# Patient Record
Sex: FEMALE | HISPANIC OR LATINO | Employment: UNEMPLOYED | URBAN - METROPOLITAN AREA
[De-identification: names, ages, dates, MRNs, and addresses within clinical notes are randomized per-mention and may not be internally consistent; named-entity substitution may affect disease eponyms.]

---

## 2017-02-13 ENCOUNTER — OFFICE VISIT (OUTPATIENT)
Dept: NEUROLOGY | Facility: MEDICAL CENTER | Age: 32
End: 2017-02-13
Payer: COMMERCIAL

## 2017-02-13 VITALS
OXYGEN SATURATION: 96 % | HEART RATE: 62 BPM | BODY MASS INDEX: 23 KG/M2 | SYSTOLIC BLOOD PRESSURE: 106 MMHG | HEIGHT: 62 IN | TEMPERATURE: 98.2 F | WEIGHT: 125 LBS | DIASTOLIC BLOOD PRESSURE: 68 MMHG | RESPIRATION RATE: 16 BRPM

## 2017-02-13 DIAGNOSIS — G43.019 INTRACTABLE MIGRAINE WITHOUT AURA AND WITHOUT STATUS MIGRAINOSUS: Primary | ICD-10-CM

## 2017-02-13 PROCEDURE — 99213 OFFICE O/P EST LOW 20 MIN: CPT | Performed by: PSYCHIATRY & NEUROLOGY

## 2017-02-13 RX ORDER — TOPIRAMATE SPINKLE 15 MG/1
15 CAPSULE ORAL EVERY EVENING
Qty: 30 CAP | Refills: 0 | Status: SHIPPED | OUTPATIENT
Start: 2017-02-13 | End: 2017-08-14 | Stop reason: SDUPTHER

## 2017-02-13 RX ORDER — TOPIRAMATE 25 MG/1
25 TABLET ORAL EVERY EVENING
Qty: 90 TAB | Refills: 3 | Status: SHIPPED | OUTPATIENT
Start: 2017-02-13 | End: 2017-08-14 | Stop reason: SDUPTHER

## 2017-02-13 RX ORDER — SUMATRIPTAN 100 MG/1
TABLET, FILM COATED ORAL
Qty: 27 TAB | Refills: 3 | Status: SHIPPED | OUTPATIENT
Start: 2017-02-13 | End: 2017-08-14 | Stop reason: SDUPTHER

## 2017-02-13 ASSESSMENT — ENCOUNTER SYMPTOMS: HEADACHES: 1

## 2017-02-13 NOTE — PROGRESS NOTES
"Subjective:      Jaylyn Ogden is a 31 y.o. female who presents with her , who provides translation services, for follow-up with a history of migraine without aura.     HPI    Jaylyn's headaches are actually improved, on Topamax 25 mg daily at bedtime, she is having them maybe once a week. Her  recognizes that she is much happier, she is functioning at a higher level on a more regular basis. She still has headaches maybe once a week, for the sumatriptan 100 mg is used early, she actually has good inconsistent relief within an hour or so. This helps all of the symptoms that are part of each headache attack. Higher doses of Topamax weren't tolerated because of chills. She tolerates the present dose without issue, sumatriptan is well tolerated as well.    Medical, surgical and family histories are reviewed in the electronic health record, there are no new drug allergies. Other than birth control, she is on Topamax 25 mg daily at bedtime and Imitrex 100 mg tablets as needed.    Review of Systems   Neurological: Positive for headaches.   All other systems reviewed and are negative.       Objective:     /68 mmHg  Pulse 62  Temp(Src) 36.8 °C (98.2 °F)  Resp 16  Ht 1.575 m (5' 2.01\")  Wt 56.7 kg (125 lb)  BMI 22.86 kg/m2  SpO2 96%     Physical Exam    She appears no acute distress. Vital signs are stable. There is no malar rash or jaw claudication. The neck is supple. Including mental status, cranial nerves, motor, coordination, and sensory evaluations, the full examination was performed in quick and cursory fashion, again revealing no evidence of deficits.     Assessment/Plan:     1. Intractable migraine without aura and without status migrainosus  Improved, we will try a slightly smaller incremental increase in Topamax, adding 15 mg, totaling 40 mg daily at bedtime. If side effects recur, but if they're milder, I asked her to stick it out for a little longer since a lot of the time " things attenuate. If so, the higher dose will probably provide better benefit. The only other option would be to change her to a different medication, and with this, there is no clear likelihood that it will be better or better tolerated. In that circumstance, she would likely it stick out on Topamax 25 mg daily at bedtime. They will update me as to tolerability and efficacy of the higher dose of Topamax. She knows how to use Imitrex appropriately. Face-to-face time was spent reviewing all the above. I will follow-up with her in 6 months.    - sumatriptan (IMITREX) 100 MG tablet; 1 tab at headache onset; repeat in 1 hour prn  Dispense: 27 Tab; Refill: 3  - topiramate (TOPOMAX) 15 MG CAPSULE SPRINKLE; Take 1 Cap by mouth every evening.  Dispense: 30 Cap; Refill: 0  - topiramate (TOPAMAX) 25 MG Tab; Take 1 Tab by mouth every evening.  Dispense: 90 Tab; Refill: 3    Time: Evaluation 20 minutes for exam, review, discussion, and education  Discussion: As mentioned in assessment, over 50% of the time spent face-to-face counseling and coordinating care

## 2017-02-13 NOTE — MR AVS SNAPSHOT
"Waldemareseleazar Ogden   2017 10:40 AM   Office Visit   MRN: [id number]    Department:  Neurology [facility]   Dept Phone:  [phone]    Description:  Female : 1985   Provider:  Remy Wong M.D.           Reason for Visit     Follow-Up migraine      Allergies as of 2017     Allergen Noted Reactions    No Known Drug Allergy 2012         You were diagnosed with     Intractable migraine without aura and without status migrainosus   [108513]  -  Primary       Vital Signs     Blood Pressure Pulse Temperature Respirations Height Weight    106/68 mmHg 62 36.8 °C (98.2 °F) 16 1.575 m (5' 2.01\") 56.7 kg (125 lb)    Body Mass Index Oxygen Saturation Smoking Status             22.86 kg/m2 96% Never Smoker          Basic Information     Date Of Birth Sex Race Ethnicity Preferred Language    1985 Female  or   Origin (Sammarinese,Kazakh,Jamaican,Citizen of [place], etc) English      Your appointments     Aug 14, 2017 10:00 AM   Follow Up Visit with Remy Wong M.D.   [facility] (--)    [address], [address]  [facility] [id number] [phone]           You will be receiving a confirmation call a few days before your appointment from our automated call confirmation system.              Problem List              ICD-10-CM Priority Class Noted - Resolved    Intractable migraine without aura and without status migrainosus G43.019   2016 - Present      Health Maintenance        Date Due Completion Dates    IMM DTaP/Tdap/Td Vaccine (1 - Tdap) 3/9/2006 ---    PAP SMEAR 3/9/2008 ---    IMM INFLUENZA (1) 2016 ---            Current Immunizations     No immunizations on file.      Below and/or attached are the medications your provider expects you to take. Review all of your home medications and newly ordered medications with your provider and/or pharmacist. Follow medication instructions as directed by your provider and/or pharmacist. Please keep " your medication list with you and share with your provider. Update the information when medications are discontinued, doses are changed, or new medications (including over-the-counter products) are added; and carry medication information at all times in the event of emergency situations     Allergies:  NO KNOWN DRUG ALLERGY - (reactions not documented)               Medications  Valid as of: February 13, 2017 - 11:02 AM    Generic Name Brand Name Tablet Size Instructions for use    Levonorgestrel-Ethinyl Estrad   Take  by mouth.        SUMAtriptan Succinate (Tab) IMITREX 100 MG 1 tab at headache onset; repeat in 1 hour prn        Topiramate (CAPSULE SPRINKLE) TOPOMAX 15 MG Take 1 Cap by mouth every evening.        Topiramate (Tab) TOPAMAX 25 MG Take 1 Tab by mouth every evening.        .                 Medicines prescribed today were sent to:     [facility][id number] - [place] - [address]    [address] [place] [address]    Phone: [phone] Fax: [phone]    Open 24 Hours?: No      Medication refill instructions:       If your prescription bottle indicates you have medication refills left, it is not necessary to call your provider’s office. Please contact your pharmacy and they will refill your medication.    If your prescription bottle indicates you do not have any refills left, you may request refills at any time through one of the following ways: The online [employer] system (except Urgent Care), by calling your provider’s office, or by asking your pharmacy to contact your provider’s office with a refill request. Medication refills are processed only during regular business hours and may not be available until the next business day. Your provider may request additional information or to have a follow-up visit with you prior to refilling your medication.   *Please Note: Medication refills are assigned a new Rx number when refilled electronically. Your pharmacy may indicate that no refills were authorized even  though a new prescription for the same medication is available at the pharmacy. Please request the medicine by name with the pharmacy before contacting your provider for a refill.           Coupad Access Code: [email or web address]  Expires: 3/15/2017 11:02 AM    Your email address is not on file at [facility].  Email Addresses are required for you to sign up for Coupad, please contact [phone] to verify your personal information and to provide your email address prior to attempting to register for Coupad.    Jaylyn Mccarthy Melvi  [address] [place] [address]    Coupad  A secure, online tool to manage your health information     [employer] [facility]’s Coupad® is a secure, online tool that connects you to your personalized health information from the privacy of your home -- day or night - making it very easy for you to manage your healthcare. Once the activation process is completed, you can even access your medical information using the Coupad ventura, which is available for free in the Apple Ventura store or Google Play store.     To learn more about Coupad, visit [email or web address]    There are two levels of access available (as shown below):   My Chart Features  [facility] Primary Care Doctor [facility]  Specialists [facility]  Urgent  Care Non-[facility] Primary Care Doctor   Email your healthcare team securely and privately 24/7 X X X    Manage appointments: schedule your next appointment; view details of past/upcoming appointments X      Request prescription refills. X      View recent personal medical records, including lab and immunizations X X X X   View health record, including health history, allergies, medications X X X X   Read reports about your outpatient visits, procedures, consult and ER notes X X X X   See your discharge summary, which is a recap of your hospital and/or ER visit that includes your diagnosis, lab results, and care plan X X  X     How to register for Coupad:  Once your e-mail address has been  verified, follow the following steps to sign up for [employer].     1. Go to  [email or web address]  2. Click on the Sign Up Now box, which takes you to the New Member Sign Up page. You will need to provide the following information:  a. Enter your Oceanlinx Access Code exactly as it appears at the top of this page. (You will not need to use this code after you’ve completed the sign-up process. If you do not sign up before the expiration date, you must request a new code.)   b. Enter your date of birth.   c. Enter your home email address.   d. Click Submit, and follow the next screen’s instructions.  3. Create a Oceanlinx ID. This will be your Oceanlinx login ID and cannot be changed, so think of one that is secure and easy to remember.  4. Create a Netscapet password. You can change your password at any time.  5. Enter your Password Reset Question and Answer. This can be used at a later time if you forget your password.   6. Enter your e-mail address. This allows you to receive e-mail notifications when new information is available in Oceanlinx.  7. Click Sign Up. You can now view your health information.    For assistance activating your Oceanlinx account, call [phone]

## 2017-03-20 ENCOUNTER — TELEPHONE (OUTPATIENT)
Dept: NEUROLOGY | Facility: MEDICAL CENTER | Age: 32
End: 2017-03-20

## 2017-03-20 NOTE — TELEPHONE ENCOUNTER
Pt's  is calling and stating that the pt is doing really well with the Topamax 25 mg at bed time. He will call and get refills at the pharmacy for her medication. GALEN

## 2017-08-14 ENCOUNTER — OFFICE VISIT (OUTPATIENT)
Dept: NEUROLOGY | Facility: MEDICAL CENTER | Age: 32
End: 2017-08-14
Payer: COMMERCIAL

## 2017-08-14 VITALS
TEMPERATURE: 97.3 F | SYSTOLIC BLOOD PRESSURE: 116 MMHG | RESPIRATION RATE: 14 BRPM | BODY MASS INDEX: 22.87 KG/M2 | OXYGEN SATURATION: 99 % | WEIGHT: 124.3 LBS | HEART RATE: 60 BPM | DIASTOLIC BLOOD PRESSURE: 70 MMHG | HEIGHT: 62 IN

## 2017-08-14 DIAGNOSIS — G43.019 INTRACTABLE MIGRAINE WITHOUT AURA AND WITHOUT STATUS MIGRAINOSUS: Primary | ICD-10-CM

## 2017-08-14 PROCEDURE — 99213 OFFICE O/P EST LOW 20 MIN: CPT | Performed by: PSYCHIATRY & NEUROLOGY

## 2017-08-14 RX ORDER — SUMATRIPTAN 100 MG/1
TABLET, FILM COATED ORAL
Qty: 27 TAB | Refills: 3 | Status: SHIPPED | OUTPATIENT
Start: 2017-08-14 | End: 2018-08-13

## 2017-08-14 RX ORDER — TOPIRAMATE SPINKLE 15 MG/1
15 CAPSULE ORAL EVERY EVENING
Qty: 90 CAP | Refills: 3 | Status: SHIPPED | OUTPATIENT
Start: 2017-08-14 | End: 2018-08-13 | Stop reason: SDUPTHER

## 2017-08-14 RX ORDER — TOPIRAMATE 25 MG/1
25 TABLET ORAL EVERY EVENING
Qty: 90 TAB | Refills: 3 | Status: SHIPPED | OUTPATIENT
Start: 2017-08-14 | End: 2018-08-13 | Stop reason: SDUPTHER

## 2017-08-14 ASSESSMENT — ENCOUNTER SYMPTOMS
HEADACHES: 1
MEMORY LOSS: 0
SPEECH CHANGE: 0

## 2017-08-14 ASSESSMENT — PATIENT HEALTH QUESTIONNAIRE - PHQ9: CLINICAL INTERPRETATION OF PHQ2 SCORE: 0

## 2017-08-14 ASSESSMENT — PAIN SCALES - GENERAL: PAINLEVEL: 3=SLIGHT PAIN

## 2017-08-14 NOTE — PROGRESS NOTES
"Subjective:      Jaylyn Ogden is a 32 y.o. female who presents with her  Joshua, who helps with translation, for follow-up with a history of migraine without aura.    HPI    Jaylyn had done well for the first month after I had last seen her, we did increase Topamax from 25 mg daily at bedtime to 40 mg daily at bedtime. She tolerated the dosing, unfortunately, a miscommunication with the office prevented her from refilling the 15 mg strength tablet, she has been back on 25 mg daily at bedtime since then for the last 4 months. The headache control is still good, though still suboptimal. On the higher dose, she essentially was headache free. Imitrex 100 mg still provides good benefit when used. In general she still remains happy.    Medical, surgical and family history reviewed, there are no drug allergies. She is on Topamax 25 mg daily at bedtime, Imitrex 100 mg when necessary and her birth control.    Review of Systems   Constitutional: Negative for malaise/fatigue.   Neurological: Positive for headaches. Negative for speech change.   Psychiatric/Behavioral: Negative for memory loss.   All other systems reviewed and are negative.       Objective:     /70 mmHg  Pulse 60  Temp(Src) 36.3 °C (97.3 °F)  Resp 14  Ht 1.575 m (5' 2\")  Wt 56.382 kg (124 lb 4.8 oz)  BMI 22.73 kg/m2  SpO2 99%     Physical Exam    She appears in no acute distress. Vital signs are stable. There is no malar rash. The neck is supple. Cardiac evaluation reveals a regular rhythm. There is no lower extremity edema. Including mental status, cranial nerves, motor, reflex, coordination, and sensory evaluations, the full neurologic examination is done and reveals no evidence of deficits nor toxicities.     Assessment/Plan:     1. Intractable migraine without aura and without status migrainosus  We will re-increase Topamax to 40 mg daily at bedtime. Higher doses, fortunately, will not be required at this time, also a good thing " since she doesn't tolerate higher doses very well. She has no side effects at this dose. The headache control seems to be much better than 25 mg alone, I've refilled her for the next year to make sure the same SNAFU does not recur. I will follow up one year otherwise. Phone contact in the interim if needed.    - topiramate (TOPOMAX) 15 MG CAPSULE SPRINKLE; Take 1 Cap by mouth every evening.  Dispense: 90 Cap; Refill: 3  - topiramate (TOPAMAX) 25 MG Tab; Take 1 Tab by mouth every evening.  Dispense: 90 Tab; Refill: 3  - sumatriptan (IMITREX) 100 MG tablet; 1 tab at headache onset; repeat in 1 hour prn  Dispense: 27 Tab; Refill: 3    Time: Evaluation of 20 minutes for exam, review, discussion, and education  Discussion: As mentioned in the assessment, over 50% of the time spent face-to-face counseling and coordinate care

## 2017-08-14 NOTE — MR AVS SNAPSHOT
"Jaylyn Ogden   2017 10:00 AM   Office Visit   MRN: [id number]    Department:  [facility]   Dept Phone:  [phone]    Description:  Female : 1985   Provider:  Remy Wong M.D.           Reason for Visit     Follow-Up 6m FV, intractable migraine w/o aura and w/o status migrainosus      Allergies as of 2017     Allergen Noted Reactions    No Known Drug Allergy 2012         You were diagnosed with     Intractable migraine without aura and without status migrainosus   [104225]  -  Primary       Vital Signs     Blood Pressure Pulse Temperature Respirations Height Weight    116/70 mmHg 60 36.3 °C (97.3 °F) 14 1.575 m (5' 2\") 56.382 kg (124 lb 4.8 oz)    Body Mass Index Oxygen Saturation Smoking Status             22.73 kg/m2 99% Never Smoker          Basic Information     Date Of Birth Sex Race Ethnicity Preferred Language    1985 Female  or   Origin (Monegasque,Cuban,Mauritian,Christo, etc) English      Problem List              ICD-10-CM Priority Class Noted - Resolved    Intractable migraine without aura and without status migrainosus G43.019   2016 - Present      Health Maintenance        Date Due Completion Dates    IMM DTaP/Tdap/Td Vaccine (1 - Tdap) 3/9/2006 ---    PAP SMEAR 3/9/2008 ---    IMM INFLUENZA (1) 2017 ---            Current Immunizations     No immunizations on file.      Below and/or attached are the medications your provider expects you to take. Review all of your home medications and newly ordered medications with your provider and/or pharmacist. Follow medication instructions as directed by your provider and/or pharmacist. Please keep your medication list with you and share with your provider. Update the information when medications are discontinued, doses are changed, or new medications (including over-the-counter products) are added; and carry medication information at all times in the event of emergency " situations     Allergies:  NO KNOWN DRUG ALLERGY - (reactions not documented)               Medications  Valid as of: August 14, 2017 - 10:20 AM    Generic Name Brand Name Tablet Size Instructions for use    Levonorgestrel-Ethinyl Estrad   Take  by mouth.        SUMAtriptan Succinate (Tab) IMITREX 100 MG 1 tab at headache onset; repeat in 1 hour prn        Topiramate (CAPSULE SPRINKLE) TOPOMAX 15 MG Take 1 Cap by mouth every evening.        Topiramate (Tab) TOPAMAX 25 MG Take 1 Tab by mouth every evening.        .                 Medicines prescribed today were sent to:     [facility][id number] - [place] - [address] [place] [address]    Phone: [phone] Fax: [phone]    Open 24 Hours?: No      Medication refill instructions:       If your prescription bottle indicates you have medication refills left, it is not necessary to call your provider’s office. Please contact your pharmacy and they will refill your medication.    If your prescription bottle indicates you do not have any refills left, you may request refills at any time through one of the following ways: The online ZupCat system (except Urgent Care), by calling your provider’s office, or by asking your pharmacy to contact your provider’s office with a refill request. Medication refills are processed only during regular business hours and may not be available until the next business day. Your provider may request additional information or to have a follow-up visit with you prior to refilling your medication.   *Please Note: Medication refills are assigned a new Rx number when refilled electronically. Your pharmacy may indicate that no refills were authorized even though a new prescription for the same medication is available at the pharmacy. Please request the medicine by name with the pharmacy before contacting your provider for a refill.           ZupCat Access Code: [email or web address]  Expires: 9/13/2017  9:42 AM    Your email  address is not on file at [facility].  Email Addresses are required for you to sign up for [employer], please contact [phone] to verify your personal information and to provide your email address prior to attempting to register for [employer].    Jaylyn Mccarthy Depaz  [address] [place] [address]    MyChart  A secure, online tool to manage your health information     [facility]’s [employer]® is a secure, online tool that connects you to your personalized health information from the privacy of your home -- day or night - making it very easy for you to manage your healthcare. Once the activation process is completed, you can even access your medical information using the [employer] ventura, which is available for free in the Apple Ventura store or Google Play store.     To learn more about [employer], visit [email or web address]    There are two levels of access available (as shown below):   My Chart Features  [facility] Primary Care Doctor [facility]  Specialists [facility]  Urgent  Care Non-[facility] Primary Care Doctor   Email your healthcare team securely and privately 24/7 X X X    Manage appointments: schedule your next appointment; view details of past/upcoming appointments X      Request prescription refills. X      View recent personal medical records, including lab and immunizations X X X X   View health record, including health history, allergies, medications X X X X   Read reports about your outpatient visits, procedures, consult and ER notes X X X X   See your discharge summary, which is a recap of your hospital and/or ER visit that includes your diagnosis, lab results, and care plan X X  X     How to register for TapSenset:  Once your e-mail address has been verified, follow the following steps to sign up for TapSenset.     1. Go to  [email or web address]  2. Click on the Sign Up Now box, which takes you to the New Member Sign Up page. You will need to provide the following information:  a. Enter your [employer] Access Code  exactly as it appears at the top of this page. (You will not need to use this code after you’ve completed the sign-up process. If you do not sign up before the expiration date, you must request a new code.)   b. Enter your date of birth.   c. Enter your home email address.   d. Click Submit, and follow the next screen’s instructions.  3. Create a [employer] ID. This will be your [employer] login ID and cannot be changed, so think of one that is secure and easy to remember.  4. Create a [employer] password. You can change your password at any time.  5. Enter your Password Reset Question and Answer. This can be used at a later time if you forget your password.   6. Enter your e-mail address. This allows you to receive e-mail notifications when new information is available in [employer].  7. Click Sign Up. You can now view your health information.    For assistance activating your [employer] account, call [phone]

## 2018-01-07 ENCOUNTER — OFFICE VISIT (OUTPATIENT)
Dept: URGENT CARE | Facility: PHYSICIAN GROUP | Age: 33
End: 2018-01-07
Payer: COMMERCIAL

## 2018-01-07 VITALS
SYSTOLIC BLOOD PRESSURE: 126 MMHG | HEIGHT: 62 IN | HEART RATE: 65 BPM | DIASTOLIC BLOOD PRESSURE: 80 MMHG | TEMPERATURE: 99.1 F | OXYGEN SATURATION: 98 % | WEIGHT: 125 LBS | BODY MASS INDEX: 23 KG/M2

## 2018-01-07 DIAGNOSIS — H10.31 ACUTE BACTERIAL CONJUNCTIVITIS OF RIGHT EYE: ICD-10-CM

## 2018-01-07 DIAGNOSIS — J11.1 INFLUENZA: ICD-10-CM

## 2018-01-07 PROCEDURE — 99214 OFFICE O/P EST MOD 30 MIN: CPT | Performed by: PHYSICIAN ASSISTANT

## 2018-01-07 RX ORDER — ALBUTEROL SULFATE 90 UG/1
2 AEROSOL, METERED RESPIRATORY (INHALATION) EVERY 4 HOURS PRN
Qty: 1 INHALER | Refills: 0 | Status: SHIPPED | OUTPATIENT
Start: 2018-01-07 | End: 2018-05-18

## 2018-01-07 RX ORDER — POLYMYXIN B SULFATE AND TRIMETHOPRIM 1; 10000 MG/ML; [USP'U]/ML
1 SOLUTION OPHTHALMIC EVERY 4 HOURS
Qty: 1 BOTTLE | Refills: 0 | Status: SHIPPED | OUTPATIENT
Start: 2018-01-07

## 2018-01-07 ASSESSMENT — ENCOUNTER SYMPTOMS
PSYCHIATRIC NEGATIVE: 1
HEADACHES: 1
EYES NEGATIVE: 1
COUGH: 1
GASTROINTESTINAL NEGATIVE: 1
CARDIOVASCULAR NEGATIVE: 1
MYALGIAS: 1
FEVER: 1
CHILLS: 1

## 2018-01-07 NOTE — PATIENT INSTRUCTIONS
Flonase and nasal saline irrigation (netti pot or Jimmy Med Sinus Rinse).  Used distilled water or boiled tap water with nasal flushes, not straight tap water.  Humidifier at bedtime.  Hot steam showers to loosen up mucous.  Cough medicine such Delsym .  Lots of fluids, tea with honey.  Inhaler every 4 hours.  Salt water gargles.  Switch toothbrush in 2 days.  Return if worsening: Yellow thicker mucus changes, worsening pain around the eyes and radiates to the teeth, and fever over 101°F.

## 2018-01-07 NOTE — PROGRESS NOTES
Subjective:      Jaylyn Ogden is a 32 y.o. female who presents with Eye Problem (x 1 day. Right eye red with discharge.) and Cough (x 1 week, runny nosel.)            HPI     Chief Complaint   Patient presents with   • Eye Problem     x 1 day. Right eye red with discharge.   • Cough     x 1 week, runny nosel.       HPI:  Jaylyn Ogden is a 32 y.o. Female who presents with eye mucosu and a cough and runny nose for 1 week.  Having irriatation and gritty feeling.  Having matted lashes.  Runny nose for 1 week with green mucous.   Non productive cough.  Some white drainage.  Has tried alkerseltzer plus and cough and cold medicine.  Did help a little bit.  Some fever and body aches and chills during the days.      Patient denies SOB, chest pain, palpitations, or n/v/d.      Past Medical History:   Diagnosis Date   • Migraine        No past surgical history on file.    No family history on file.  No pertinent family history.    Social History     Social History   • Marital status:      Spouse name: N/A   • Number of children: N/A   • Years of education: N/A     Occupational History   • Not on file.     Social History Main Topics   • Smoking status: Never Smoker   • Smokeless tobacco: Never Used   • Alcohol use No   • Drug use: No   • Sexual activity: Yes     Partners: Male     Birth control/ protection: Pill     Other Topics Concern   • Not on file     Social History Narrative   • No narrative on file         Current Outpatient Prescriptions:   •  topiramate, 15 mg, Oral, Q EVENING, Taking  •  topiramate, 25 mg, Oral, Q EVENING, Taking  •  Levonorgestrel-Ethinyl Estrad (MARLISSA PO), Take  by mouth., Taking  •  sumatriptan, 1 tab at headache onset; repeat in 1 hour prn, Unknown    Allergies   Allergen Reactions   • No Known Drug Allergy         Review of Systems   Constitutional: Positive for chills, fever and malaise/fatigue.   HENT: Positive for congestion.    Eyes: Negative.    Respiratory:  "Positive for cough.    Cardiovascular: Negative.    Gastrointestinal: Negative.    Genitourinary: Negative.    Musculoskeletal: Positive for myalgias.   Skin: Negative.    Neurological: Positive for headaches.   Endo/Heme/Allergies: Negative.    Psychiatric/Behavioral: Negative.           Objective:     /80   Pulse 65   Temp 37.3 °C (99.1 °F)   Ht 1.581 m (5' 2.25\")   Wt 56.7 kg (125 lb)   SpO2 98%   Breastfeeding? No   BMI 22.68 kg/m²      Physical Exam       Nursing note and vital signs reviewed.    Constitutional:  Appropriately groomed, pleasant affect, well nourished, and in no acute distress.    HEENT:  Head: Atruamatic, normocephalic.    Ears:  EAC with mild cerumen bilaterally, not erythematous.  TM’s pearly gray with cone of light present and umbo and malleolus visible bilaterally.  No bulging or fluid bubbles present in middle ear.    Eyes:  PERRLA, EOM's full, sclera injected on right with exudate in the medial canthus.    Nose:  Nares patent bilaterally.  Nasal mucosa edematous with clear rhinorrhea bilaterally.  Frontal and maxillary sinuses not tender to percussion.    Throat:  Oropharynx erythematous, with no enlargement of the palatine tonsils bilaterally with no exudates.    Posterior oropharynx with cobblestoning and clear to white post nasal drainage present.  Soft palate rises symmetrically bilaterally and uvula midline.  Neck supple, with mild proximal anterior cervical chain lymphadenopathy that is soft and mobile to palpation.      Lungs: Respiratory effort within normal limits. Lungs clear to auscultation bilaterally. No crackles or rhonchi noted.     Heart:  Regular rate and rhythm without rubs, murmurs, or gallops. Dorsalis pedis and radial pulses 2+ bilaterally. No lower extremity edema.    Muscle skeletal:  Gait and station wnl, non antalgic.    Derm:  No lesions or rashes. Overall good turgor pressure.     Psychiatric:  Normal judgement, mood and affect.     "   Assessment/Plan:     1. Acute bacterial conjunctivitis of right eye  polymixin-trimethoprim (POLYTRIM) 24208-3.1 UNIT/ML-% Solution    albuterol 108 (90 Base) MCG/ACT Aero Soln inhalation aerosol   2. Influenza        Patient presents with right sided conjunctivitis and suspected tail end of influenza.  Did rx albuterol and polytrim.  Reviewed s/s measures.    Reviewed symptoms etiology of influenza and expected duration. Reviewed symptoms support measures including humidifier, nasal saline irrigation, and pushing fluids.    Patient was in agreement with this treatment plan and seemed to understand without barriers. All questions were encouraged and answered.  Reviewed signs and symptoms of when to seek emergency medical care.     Please note that this dictation was created using voice recognition software.  I have made every reasonable attempt to correct obvious errors, but I expect there are errors of elena and possibly content that I did not discover before finalizing the note.

## 2018-01-07 NOTE — LETTER
January 7, 2018         Patient: Jaylyn Ogden   YOB: 1985   Date of Visit: 1/7/2018           To Whom it May Concern:    Jaylyn Ogden was seen in my clinic on 1/7/2018. Please excuse her from work 1/8, and 1/9.    If you have any questions or concerns, please don't hesitate to call.        Sincerely,           Terence Saba P.A.-C.  Electronically Signed

## 2018-05-18 ENCOUNTER — HOSPITAL ENCOUNTER (EMERGENCY)
Facility: MEDICAL CENTER | Age: 33
End: 2018-05-18
Attending: EMERGENCY MEDICINE
Payer: COMMERCIAL

## 2018-05-18 ENCOUNTER — OFFICE VISIT (OUTPATIENT)
Dept: URGENT CARE | Facility: PHYSICIAN GROUP | Age: 33
End: 2018-05-18
Payer: COMMERCIAL

## 2018-05-18 VITALS
DIASTOLIC BLOOD PRESSURE: 76 MMHG | SYSTOLIC BLOOD PRESSURE: 108 MMHG | BODY MASS INDEX: 23.19 KG/M2 | HEIGHT: 62 IN | HEART RATE: 60 BPM | OXYGEN SATURATION: 98 % | WEIGHT: 126 LBS | TEMPERATURE: 99.2 F

## 2018-05-18 VITALS
HEIGHT: 66 IN | OXYGEN SATURATION: 100 % | TEMPERATURE: 98.4 F | RESPIRATION RATE: 14 BRPM | HEART RATE: 68 BPM | DIASTOLIC BLOOD PRESSURE: 82 MMHG | WEIGHT: 127.21 LBS | SYSTOLIC BLOOD PRESSURE: 126 MMHG | BODY MASS INDEX: 20.44 KG/M2

## 2018-05-18 DIAGNOSIS — R11.2 NAUSEA AND VOMITING, INTRACTABILITY OF VOMITING NOT SPECIFIED, UNSPECIFIED VOMITING TYPE: ICD-10-CM

## 2018-05-18 DIAGNOSIS — R51.9 INTRACTABLE HEADACHE, UNSPECIFIED CHRONICITY PATTERN, UNSPECIFIED HEADACHE TYPE: ICD-10-CM

## 2018-05-18 DIAGNOSIS — R51.9 NONINTRACTABLE HEADACHE, UNSPECIFIED CHRONICITY PATTERN, UNSPECIFIED HEADACHE TYPE: ICD-10-CM

## 2018-05-18 PROCEDURE — 96375 TX/PRO/DX INJ NEW DRUG ADDON: CPT

## 2018-05-18 PROCEDURE — 96374 THER/PROPH/DIAG INJ IV PUSH: CPT

## 2018-05-18 PROCEDURE — 99284 EMERGENCY DEPT VISIT MOD MDM: CPT

## 2018-05-18 PROCEDURE — A9270 NON-COVERED ITEM OR SERVICE: HCPCS | Performed by: EMERGENCY MEDICINE

## 2018-05-18 PROCEDURE — 700105 HCHG RX REV CODE 258: Performed by: EMERGENCY MEDICINE

## 2018-05-18 PROCEDURE — 700111 HCHG RX REV CODE 636 W/ 250 OVERRIDE (IP): Performed by: EMERGENCY MEDICINE

## 2018-05-18 PROCEDURE — 700102 HCHG RX REV CODE 250 W/ 637 OVERRIDE(OP): Performed by: EMERGENCY MEDICINE

## 2018-05-18 PROCEDURE — 99213 OFFICE O/P EST LOW 20 MIN: CPT | Performed by: NURSE PRACTITIONER

## 2018-05-18 RX ORDER — DEXAMETHASONE SODIUM PHOSPHATE 10 MG/ML
10 INJECTION, SOLUTION INTRAMUSCULAR; INTRAVENOUS ONCE
Status: COMPLETED | OUTPATIENT
Start: 2018-05-18 | End: 2018-05-18

## 2018-05-18 RX ORDER — DIPHENHYDRAMINE HYDROCHLORIDE 50 MG/ML
25 INJECTION INTRAMUSCULAR; INTRAVENOUS ONCE
Status: COMPLETED | OUTPATIENT
Start: 2018-05-18 | End: 2018-05-18

## 2018-05-18 RX ORDER — KETOROLAC TROMETHAMINE 30 MG/ML
15 INJECTION, SOLUTION INTRAMUSCULAR; INTRAVENOUS ONCE
Status: COMPLETED | OUTPATIENT
Start: 2018-05-18 | End: 2018-05-18

## 2018-05-18 RX ORDER — METOCLOPRAMIDE HYDROCHLORIDE 5 MG/ML
10 INJECTION INTRAMUSCULAR; INTRAVENOUS ONCE
Status: COMPLETED | OUTPATIENT
Start: 2018-05-18 | End: 2018-05-18

## 2018-05-18 RX ORDER — SODIUM CHLORIDE 9 MG/ML
1000 INJECTION, SOLUTION INTRAVENOUS ONCE
Status: COMPLETED | OUTPATIENT
Start: 2018-05-18 | End: 2018-05-18

## 2018-05-18 RX ORDER — MORPHINE SULFATE 4 MG/ML
4 INJECTION, SOLUTION INTRAMUSCULAR; INTRAVENOUS ONCE
Status: COMPLETED | OUTPATIENT
Start: 2018-05-18 | End: 2018-05-18

## 2018-05-18 RX ORDER — ACETAMINOPHEN 325 MG/1
650 TABLET ORAL ONCE
Status: COMPLETED | OUTPATIENT
Start: 2018-05-18 | End: 2018-05-18

## 2018-05-18 RX ADMIN — DIPHENHYDRAMINE HYDROCHLORIDE 25 MG: 50 INJECTION, SOLUTION INTRAMUSCULAR; INTRAVENOUS at 16:27

## 2018-05-18 RX ADMIN — MORPHINE SULFATE 4 MG: 4 INJECTION INTRAVENOUS at 16:31

## 2018-05-18 RX ADMIN — SODIUM CHLORIDE 1000 ML: 9 INJECTION, SOLUTION INTRAVENOUS at 16:25

## 2018-05-18 RX ADMIN — KETOROLAC TROMETHAMINE 15 MG: 30 INJECTION, SOLUTION INTRAMUSCULAR; INTRAVENOUS at 16:28

## 2018-05-18 RX ADMIN — METOCLOPRAMIDE 10 MG: 5 INJECTION, SOLUTION INTRAMUSCULAR; INTRAVENOUS at 16:25

## 2018-05-18 RX ADMIN — DEXAMETHASONE SODIUM PHOSPHATE 10 MG: 10 INJECTION, SOLUTION INTRAMUSCULAR; INTRAVENOUS at 16:26

## 2018-05-18 RX ADMIN — ACETAMINOPHEN 650 MG: 325 TABLET, FILM COATED ORAL at 16:24

## 2018-05-18 ASSESSMENT — ENCOUNTER SYMPTOMS
VOMITING: 1
HEADACHES: 1
LOSS OF CONSCIOUSNESS: 0
PALPITATIONS: 0
FOCAL WEAKNESS: 0
BLURRED VISION: 1
ABDOMINAL PAIN: 0
COUGH: 0
CHILLS: 0
SENSORY CHANGE: 0
FEVER: 0
EYE REDNESS: 0
HEMOPTYSIS: 0
TREMORS: 0
NECK PAIN: 0
EYE DISCHARGE: 0
BACK PAIN: 0
EYE PAIN: 0
NAUSEA: 1
MYALGIAS: 0
DIZZINESS: 0
TINGLING: 1

## 2018-05-18 ASSESSMENT — PAIN SCALES - GENERAL: PAINLEVEL_OUTOF10: 8

## 2018-05-18 ASSESSMENT — LIFESTYLE VARIABLES: SUBSTANCE_ABUSE: 0

## 2018-05-18 NOTE — ED NOTES
Lights dimmed, soft music playing on TV, patient positioned for comfort. Family member at bedside. Stretcher low, wheels locked, side rail up, call light within reach.

## 2018-05-18 NOTE — ED NOTES
Patient alert and oriented x 4, respirations even and unlabored, c/o headache, worse when lying flat. Stretcher low, wheels locked, call light within reach.

## 2018-05-18 NOTE — PROGRESS NOTES
"Subjective:      Jaylyn Ogden is a 33 y.o. female who presents with Headache (headache bilateral accompanied with nausea and vomiting x 3 days,  )          Denies past medical, surgical or family history that is significant to today's problem.   RX or OTC medications reviewed with patient today.   Allergies   Allergen Reactions   • No Known Drug Allergy          HPI headache for 3 days with nausea and vomiting. Headache on the right side of her head to the back and on the left side by her temporal area. Pain 10/ 10. This is the worst headache of her lift. Vomiting all day long- hard to keep anything down. The pain makes her nauseated. Throbbing pain. Vision is normal right now but it was blurry this morning when she got up. Pain increases in her head when she leans over or turns her head.   She does occasionally get headaches but this headache is completely different. She is able to take OTC medication (bioelectra = diphenhydramine and acetaminophen)  with complete relief. She has been taking the medication for the past 3 days without any relief of her symptoms.       Review of Systems   Constitutional: Negative for chills, fever and malaise/fatigue.   HENT: Negative for ear pain and tinnitus.    Eyes: Positive for blurred vision. Negative for pain, discharge and redness.   Respiratory: Negative for cough and hemoptysis.    Cardiovascular: Negative for chest pain and palpitations.   Gastrointestinal: Positive for nausea and vomiting. Negative for abdominal pain.   Musculoskeletal: Negative for back pain, joint pain, myalgias and neck pain.   Skin: Negative for rash.   Neurological: Positive for tingling and headaches. Negative for dizziness, tremors, sensory change, focal weakness and loss of consciousness.   Psychiatric/Behavioral: Negative for substance abuse.          Objective:     /76   Pulse 60   Temp 37.3 °C (99.2 °F)   Ht 1.581 m (5' 2.25\")   Wt 57.2 kg (126 lb)   SpO2 98%   BMI 22.86 " kg/m²      Physical Exam   Constitutional: She is oriented to person, place, and time. She appears well-developed and well-nourished.   HENT:   Head: Normocephalic.   Eyes: EOM are normal. Pupils are equal, round, and reactive to light. Right eye exhibits no discharge. Left eye exhibits no discharge.   Neck: Normal range of motion. Neck supple. No thyromegaly present.   Cardiovascular: Normal rate and regular rhythm.    Pulmonary/Chest: Effort normal and breath sounds normal.   Neurological: She is alert and oriented to person, place, and time.   Skin: Skin is warm. Capillary refill takes less than 2 seconds.   Psychiatric: She has a normal mood and affect. Her speech is normal and behavior is normal. Thought content normal.   Nursing note and vitals reviewed.              Assessment/Plan:     1. Intractable headache, unspecified chronicity pattern, unspecified headache type  UC AMA/Refusal of Treatment   2. Nausea and vomiting, intractability of vomiting not specified, unspecified vomiting type  UC AMA/Refusal of Treatment     TO  ER for further evaluation and management.   AMA declines ambulance.

## 2018-05-19 NOTE — DISCHARGE INSTRUCTIONS
Call your doctor Monday morning and arrange office recheck during the week. Return here if he have new or worsening symptoms.

## 2018-05-19 NOTE — ED PROVIDER NOTES
ED Provider Note    CHIEF COMPLAINT  Chief Complaint   Patient presents with   • Headache     x3 days.  R side head.   • N/V   • Sent from Urgent Care       HPI  Jaylyn Ogden is a 33 y.o. female who presents to the emergency department complaining of headache. Patient has an 8 year history of similar headaches and over the last 3 days she has had a headache she doesn't recall any specific precipitating events the headache is bilateral but more on the right side than on the left she has some associated nausea and vomiting.    REVIEW OF SYSTEMS no fever or chills, no trauma, no weakness in the extremities. All other systems negative    PAST MEDICAL HISTORY  Past Medical History:   Diagnosis Date   • Migraine    • Migraine        FAMILY HISTORY  No family history on file.    SOCIAL HISTORY  Social History     Social History   • Marital status:      Spouse name: N/A   • Number of children: N/A   • Years of education: N/A     Social History Main Topics   • Smoking status: Never Smoker   • Smokeless tobacco: Never Used   • Alcohol use No   • Drug use: No   • Sexual activity: Yes     Partners: Male     Birth control/ protection: Pill     Other Topics Concern   • Not on file     Social History Narrative   • No narrative on file       SURGICAL HISTORY  No past surgical history on file.    CURRENT MEDICATIONS  Home Medications     Reviewed by Svitlana Piña R.N. (Registered Nurse) on 05/18/18 at 1445  Med List Status: Complete   Medication Last Dose Status   Levonorgestrel-Ethinyl Estrad (MARLISSA PO) 5/17/2018 Active   polymixin-trimethoprim (POLYTRIM) 56728-2.1 UNIT/ML-% Solution  Active   sumatriptan (IMITREX) 100 MG tablet 5/17/2018 Active   topiramate (TOPAMAX) 25 MG Tab 5/18/2018 Active   topiramate (TOPOMAX) 15 MG CAPSULE SPRINKLE 5/17/2018 Active                ALLERGIES  Allergies   Allergen Reactions   • No Known Drug Allergy        PHYSICAL EXAM  VITAL SIGNS: /82   Pulse 68   Temp 36.9  "°C (98.4 °F)   Resp 14   Ht 1.676 m (5' 6\")   Wt 57.7 kg (127 lb 3.3 oz)   SpO2 100%   Breastfeeding? Unknown   BMI 20.53 kg/m²    Oxygen saturation is interpreted as adequate  Constitutional: Awake and well-appearing individual in no distress  HENT: No sign of trauma to the head, mucous membranes are moist  Eyes: No erythema or discharge or jaundice  Neck: Trachea midline no JVD no meningeal findings  Cardiovascular: Regular rate and rhythm  Lungs: Clear and equal bilaterally with no apparent difficulty breathing  Skin: Warm and dry  Musculoskeletal: No acute bony deformity  Neurologic: The patient is awake and verbal speaking in a clear voice. Her face moves normally and she has good strength and coordination in the upper and lower extremities    CHART REVIEW  The patient had a CT scan of the head for the same symptoms on May 6, 2016 which was normal    MEDICAL DECISION MAKING and DISPOSITION  In the emergency Department an IV was established the patient was given intravenous fluids morphine and Reglan Benadryl Decadron and Toradol as well as oral Tylenol and her headache has completely resolved. Given that she has had similar headaches over the last 8 years and has had previous imaging of her brain I do not think that she needs additional emergency evaluation at this time. I have instructed her to call her doctor and arrange office follow-up during the week and she is to return here if she feels she is having new or worsening symptoms    IMPRESSION  1. Headache      Electronically signed by: Douglas Severino, 5/19/2018 4:22 PM      "

## 2018-05-19 NOTE — ED NOTES
Patient received discharge instructions, verbalized understanding and intent to follow. Denied any additional questions or concerns. Stable and ambulatory to discharge with steady gait. Belongings with patient at time of discharge.

## 2018-08-13 ENCOUNTER — OFFICE VISIT (OUTPATIENT)
Dept: NEUROLOGY | Facility: MEDICAL CENTER | Age: 33
End: 2018-08-13
Payer: COMMERCIAL

## 2018-08-13 VITALS
DIASTOLIC BLOOD PRESSURE: 60 MMHG | OXYGEN SATURATION: 98 % | HEART RATE: 62 BPM | BODY MASS INDEX: 21.13 KG/M2 | HEIGHT: 66 IN | TEMPERATURE: 98.1 F | WEIGHT: 131.5 LBS | SYSTOLIC BLOOD PRESSURE: 94 MMHG | RESPIRATION RATE: 16 BRPM

## 2018-08-13 DIAGNOSIS — G43.019 INTRACTABLE MIGRAINE WITHOUT AURA AND WITHOUT STATUS MIGRAINOSUS: Primary | ICD-10-CM

## 2018-08-13 PROCEDURE — 99213 OFFICE O/P EST LOW 20 MIN: CPT | Performed by: PSYCHIATRY & NEUROLOGY

## 2018-08-13 RX ORDER — METOCLOPRAMIDE 10 MG/1
TABLET ORAL
Qty: 45 TAB | Refills: 1 | Status: SHIPPED | OUTPATIENT
Start: 2018-08-13

## 2018-08-13 RX ORDER — TOPIRAMATE 25 MG/1
25 TABLET ORAL EVERY EVENING
Qty: 90 TAB | Refills: 3 | Status: SHIPPED | OUTPATIENT
Start: 2018-08-13

## 2018-08-13 RX ORDER — RIZATRIPTAN BENZOATE 10 MG/1
TABLET ORAL
Qty: 10 TAB | Refills: 1 | Status: SHIPPED | OUTPATIENT
Start: 2018-08-13

## 2018-08-13 RX ORDER — TOPIRAMATE SPINKLE 15 MG/1
15 CAPSULE ORAL EVERY EVENING
Qty: 90 CAP | Refills: 3 | Status: SHIPPED | OUTPATIENT
Start: 2018-08-13

## 2018-08-13 ASSESSMENT — ENCOUNTER SYMPTOMS
TINGLING: 0
MEMORY LOSS: 0
HEADACHES: 1
LOSS OF CONSCIOUSNESS: 0
SENSORY CHANGE: 0

## 2018-08-13 ASSESSMENT — PATIENT HEALTH QUESTIONNAIRE - PHQ9: CLINICAL INTERPRETATION OF PHQ2 SCORE: 0

## 2018-08-13 ASSESSMENT — PAIN SCALES - GENERAL: PAINLEVEL: NO PAIN

## 2018-08-13 NOTE — PROGRESS NOTES
"Subjective:      Jaylyn Ogden is a 33 y.o. female who presents with her  Joshua, for follow-up, with a history of migraine without aura.    HPI    With help with translation from her , the patient states that her headaches actually have remained under good control. Still on Topamax 40 mg daily at bedtime, she has very few cognitive side effects, she is happy with this. She very infrequently has headaches, though she did have a severe unusual five-day long event recently, there was no clear cause, but it actually put her in the emergency room. Otherwise she has very infrequent headaches for which rescue was required. What she did notice is that Imitrex does seem to consistently make the headaches worse, it certainly does not shorten the duration. She still has significant nausea and vomiting with her headaches.    Medical, surgical and family histories are reviewed in electronic health record, there are no new drug allergies. She is on Topamax 40 mg daily at bedtime, Imitrex 100 mg when necessary, and her birth control.    Review of Systems   Constitutional: Negative for malaise/fatigue.   Neurological: Positive for headaches. Negative for tingling, sensory change and loss of consciousness.   Psychiatric/Behavioral: Negative for memory loss.   All other systems reviewed and are negative.        Objective:     BP (!) 94/60   Pulse 62   Temp 36.7 °C (98.1 °F)   Resp 16   Ht 1.676 m (5' 6\")   Wt 59.6 kg (131 lb 8.1 oz)   SpO2 98%   BMI 21.23 kg/m²      Physical Exam    She appears in no acute distress. Vital signs are stable. There is no malar rash. The neck is supple, range of motion is full. Cardiac evaluation reveals a regular rhythm. There is no evidence of rash, bruising, or edema.    Including mental status, cranial nerves, motor, reflexes, coordination, and sensory evaluations, full neurologic examination is done and reveals no evidence of deficits or toxicities.     Assessment/Plan: "     1. Intractable migraine without aura and without status migrainosus  Stable on her present Topamax 40 mg regimen, this will be continued. Maxalt MLT 10 mg will be substituted for Imitrex, taken with Reglan 10 mg at first pain onset, this cocktail can be repeated hourly if needed up to #3 full doses/headache. Side effects were reviewed. She was told that the Reglan can make the Maxalt work more effectively, it might even help with its tolerability. Phone contact in the interim, we can follow-up in one year.    - metoclopramide (REGLAN) 10 MG Tab; 1-3 tab at headache/nausea onset; repeat in 4-6 hours prn  Dispense: 45 Tab; Refill: 1  - rizatriptan (MAXALT) 10 MG tablet; 1 tab at headache onset; repeat 1 tab every hour as needed up to #4 tab/24 hours  Dispense: 10 Tab; Refill: 1  - topiramate (TOPAMAX) 25 MG Tab; Take 1 Tab by mouth every evening.  Dispense: 90 Tab; Refill: 3  - topiramate (TOPOMAX) 15 MG CAPSULE SPRINKLE; Take 1 Cap by mouth every evening.  Dispense: 90 Cap; Refill: 3    Time: A total of 20 minutes was spent face-to-face for exam, review, discussion, and education, of this over 60% of the time spent counseling and coordinating care

## 2018-11-07 ENCOUNTER — OFFICE VISIT (OUTPATIENT)
Dept: URGENT CARE | Facility: PHYSICIAN GROUP | Age: 33
End: 2018-11-07
Payer: COMMERCIAL

## 2018-11-07 VITALS
HEIGHT: 63 IN | BODY MASS INDEX: 23.21 KG/M2 | SYSTOLIC BLOOD PRESSURE: 106 MMHG | DIASTOLIC BLOOD PRESSURE: 70 MMHG | OXYGEN SATURATION: 99 % | TEMPERATURE: 98.1 F | HEART RATE: 77 BPM | WEIGHT: 131 LBS

## 2018-11-07 DIAGNOSIS — J02.9 SORE THROAT: ICD-10-CM

## 2018-11-07 DIAGNOSIS — J02.0 STREP PHARYNGITIS: ICD-10-CM

## 2018-11-07 LAB
INT CON NEG: NORMAL
INT CON POS: NORMAL
S PYO AG THROAT QL: POSITIVE

## 2018-11-07 PROCEDURE — 99214 OFFICE O/P EST MOD 30 MIN: CPT | Performed by: PHYSICIAN ASSISTANT

## 2018-11-07 PROCEDURE — 87880 STREP A ASSAY W/OPTIC: CPT | Performed by: PHYSICIAN ASSISTANT

## 2018-11-07 RX ORDER — AMOXICILLIN 500 MG/1
500 CAPSULE ORAL 3 TIMES DAILY
Qty: 30 CAP | Refills: 0 | Status: SHIPPED | OUTPATIENT
Start: 2018-11-07 | End: 2018-11-17

## 2018-11-07 ASSESSMENT — ENCOUNTER SYMPTOMS
HOARSE VOICE: 1
HEADACHES: 0
FEVER: 1
NECK PAIN: 0
CHILLS: 1
STRIDOR: 0
EYE DISCHARGE: 0
SHORTNESS OF BREATH: 0
TINGLING: 0
MYALGIAS: 1
SWOLLEN GLANDS: 1
DIARRHEA: 0
DIZZINESS: 0
TROUBLE SWALLOWING: 1
SORE THROAT: 1
EYE REDNESS: 0
ABDOMINAL PAIN: 0
COUGH: 1
VOMITING: 0
WHEEZING: 0

## 2018-11-07 NOTE — LETTER
November 7, 2018         Patient: Jaylyn Ogden   YOB: 1985   Date of Visit: 11/7/2018           To Whom it May Concern:    Jaylyn Ogden was seen in my clinic on 11/7/2018. Please excuse this patient from work due to recent illness-she may return on Saturday.     If you have any questions or concerns, please don't hesitate to call.        Sincerely,           Frank Gooden P.A.-C.  Electronically Signed

## 2018-11-07 NOTE — PROGRESS NOTES
"Subjective:      Jaylyn Ogden is a 33 y.o. female who presents with Pharyngitis (Hurts to swollow, swollen tonsils with white patches, body aches, headache x 1 wk)            Pharyngitis    This is a new problem. The current episode started in the past 7 days. The problem has been waxing and waning. Maximum temperature: Pos for subjective fevers. The pain is at a severity of 6/10. The pain is moderate. Associated symptoms include coughing, a hoarse voice, swollen glands and trouble swallowing. Pertinent negatives include no abdominal pain, congestion, diarrhea, drooling, ear discharge, headaches, neck pain, shortness of breath, stridor or vomiting. She has had no exposure to strep or mono. She has tried cool liquids (OTC cold formulation) for the symptoms. The treatment provided mild relief.       Review of Systems   Constitutional: Positive for chills, fever and malaise/fatigue.   HENT: Positive for hoarse voice, sore throat and trouble swallowing. Negative for congestion, drooling and ear discharge.    Eyes: Negative for discharge and redness.   Respiratory: Positive for cough. Negative for shortness of breath, wheezing and stridor.    Cardiovascular: Negative for chest pain and leg swelling.   Gastrointestinal: Negative for abdominal pain, diarrhea and vomiting.   Genitourinary: Negative for dysuria and urgency.   Musculoskeletal: Positive for myalgias. Negative for neck pain.   Skin: Negative for itching and rash.   Neurological: Negative for dizziness, tingling and headaches.          Objective:     /70 (BP Location: Left arm, Patient Position: Sitting, BP Cuff Size: Adult)   Pulse 77   Temp 36.7 °C (98.1 °F) (Temporal)   Ht 1.6 m (5' 3\")   Wt 59.4 kg (131 lb)   SpO2 99%   BMI 23.21 kg/m²    PMH:  has a past medical history of Intractable migraine without aura and without status migrainosus (9/19/2016).  MEDS:   Current Outpatient Prescriptions:   •  amoxicillin (AMOXIL) 500 MG Cap, Take 1 " Cap by mouth 3 times a day for 10 days., Disp: 30 Cap, Rfl: 0  •  Levonorgestrel-Ethinyl Estrad (MARLISSA PO), Take  by mouth., Disp: , Rfl:   •  metoclopramide (REGLAN) 10 MG Tab, 1-3 tab at headache/nausea onset; repeat in 4-6 hours prn, Disp: 45 Tab, Rfl: 1  •  rizatriptan (MAXALT) 10 MG tablet, 1 tab at headache onset; repeat 1 tab every hour as needed up to #4 tab/24 hours, Disp: 10 Tab, Rfl: 1  •  topiramate (TOPAMAX) 25 MG Tab, Take 1 Tab by mouth every evening., Disp: 90 Tab, Rfl: 3  •  topiramate (TOPOMAX) 15 MG CAPSULE SPRINKLE, Take 1 Cap by mouth every evening., Disp: 90 Cap, Rfl: 3  •  polymixin-trimethoprim (POLYTRIM) 40003-3.1 UNIT/ML-% Solution, Place 1 Drop in both eyes every 4 hours., Disp: 1 Bottle, Rfl: 0  ALLERGIES:   Allergies   Allergen Reactions   • No Known Drug Allergy      SURGHX: No past surgical history on file.  SOCHX:  reports that she has never smoked. She has never used smokeless tobacco. She reports that she does not drink alcohol or use drugs.  FH: Family history was reviewed, no pertinent findings to report    Physical Exam   Constitutional: She is oriented to person, place, and time. She appears well-developed and well-nourished.   HENT:   Head: Normocephalic and atraumatic.   Right Ear: External ear normal.   Left Ear: External ear normal.   Nose: Nose normal.   Mouth/Throat: Oropharyngeal exudate present.   Posterior oropharynx with tonsillar edema and noted exudate. Without evidence of abscess formation.    Eyes: Pupils are equal, round, and reactive to light. EOM are normal.   Neck: Normal range of motion. Neck supple. No thyromegaly present.   Cardiovascular: Normal rate and regular rhythm.    Pulmonary/Chest: Effort normal and breath sounds normal. No respiratory distress.   Musculoskeletal: Normal range of motion. She exhibits no edema.   Lymphadenopathy:     She has cervical adenopathy.   Neurological: She is alert and oriented to person, place, and time.   Skin: Skin is  warm. No rash noted. No pallor.   Psychiatric: She has a normal mood and affect. Her behavior is normal.   Vitals reviewed.            Pos Strep.   Assessment/Plan:     1. Strep pharyngitis  - amoxicillin (AMOXIL) 500 MG Cap; Take 1 Cap by mouth 3 times a day for 10 days.  Dispense: 30 Cap; Refill: 0    2. Sore throat  - POCT Rapid Strep A    Discussed the contagious nature of symptoms today- avoid the public. Supportive therapies discussed today. Work note written.   Patient given precautionary s/sx that mandate immediate follow up and evaluation in the ED. Advised of risks of not doing so.    DDX, Supportive care, and indications for immediate follow-up discussed with patient.    Instructed to return to clinic or nearest emergency department if we are not available for any change in condition, further concerns, or worsening of symptoms.    The patient demonstrated a good understanding and agreed with the treatment plan.

## 2019-02-11 ENCOUNTER — NON-PROVIDER VISIT (OUTPATIENT)
Dept: URGENT CARE | Facility: PHYSICIAN GROUP | Age: 34
End: 2019-02-11

## 2019-02-11 DIAGNOSIS — Z02.1 PRE-EMPLOYMENT DRUG SCREENING: ICD-10-CM

## 2019-02-11 LAB
AMP AMPHETAMINE: NORMAL
COC COCAINE: NORMAL
INT CON NEG: NORMAL
INT CON POS: NORMAL
MET METHAMPHETAMINES: NORMAL
OPI OPIATES: NORMAL
PCP PHENCYCLIDINE: NORMAL
POC DRUG COMMENT 753798-OCCUPATIONAL HEALTH: NORMAL
THC: NORMAL

## 2019-02-11 PROCEDURE — 80305 DRUG TEST PRSMV DIR OPT OBS: CPT | Performed by: PHYSICIAN ASSISTANT

## 2020-01-11 ENCOUNTER — HOSPITAL ENCOUNTER (OUTPATIENT)
Dept: LAB | Facility: MEDICAL CENTER | Age: 35
End: 2020-01-11
Attending: OBSTETRICS & GYNECOLOGY
Payer: COMMERCIAL

## 2020-01-11 LAB
25(OH)D3 SERPL-MCNC: 36 NG/ML (ref 30–100)
ALBUMIN SERPL BCP-MCNC: 4.3 G/DL (ref 3.2–4.9)
ALBUMIN/GLOB SERPL: 1.4 G/DL
ALP SERPL-CCNC: 45 U/L (ref 30–99)
ALT SERPL-CCNC: 9 U/L (ref 2–50)
ANION GAP SERPL CALC-SCNC: 7 MMOL/L (ref 0–11.9)
AST SERPL-CCNC: 15 U/L (ref 12–45)
BASOPHILS # BLD AUTO: 0.7 % (ref 0–1.8)
BASOPHILS # BLD: 0.03 K/UL (ref 0–0.12)
BILIRUB SERPL-MCNC: 0.5 MG/DL (ref 0.1–1.5)
BUN SERPL-MCNC: 11 MG/DL (ref 8–22)
CALCIUM SERPL-MCNC: 9.2 MG/DL (ref 8.5–10.5)
CHLORIDE SERPL-SCNC: 106 MMOL/L (ref 96–112)
CHOLEST SERPL-MCNC: 171 MG/DL (ref 100–199)
CO2 SERPL-SCNC: 27 MMOL/L (ref 20–33)
CREAT SERPL-MCNC: 0.54 MG/DL (ref 0.5–1.4)
EOSINOPHIL # BLD AUTO: 0.04 K/UL (ref 0–0.51)
EOSINOPHIL NFR BLD: 0.9 % (ref 0–6.9)
ERYTHROCYTE [DISTWIDTH] IN BLOOD BY AUTOMATED COUNT: 40.1 FL (ref 35.9–50)
EST. AVERAGE GLUCOSE BLD GHB EST-MCNC: 111 MG/DL
FASTING STATUS PATIENT QL REPORTED: NORMAL
GLOBULIN SER CALC-MCNC: 3 G/DL (ref 1.9–3.5)
GLUCOSE SERPL-MCNC: 84 MG/DL (ref 65–99)
HBA1C MFR BLD: 5.5 % (ref 0–5.6)
HCT VFR BLD AUTO: 44.7 % (ref 37–47)
HDLC SERPL-MCNC: 50 MG/DL
HGB BLD-MCNC: 14.7 G/DL (ref 12–16)
IMM GRANULOCYTES # BLD AUTO: 0.01 K/UL (ref 0–0.11)
IMM GRANULOCYTES NFR BLD AUTO: 0.2 % (ref 0–0.9)
LDLC SERPL CALC-MCNC: 107 MG/DL
LYMPHOCYTES # BLD AUTO: 1.77 K/UL (ref 1–4.8)
LYMPHOCYTES NFR BLD: 39.3 % (ref 22–41)
MCH RBC QN AUTO: 29.5 PG (ref 27–33)
MCHC RBC AUTO-ENTMCNC: 32.9 G/DL (ref 33.6–35)
MCV RBC AUTO: 89.6 FL (ref 81.4–97.8)
MONOCYTES # BLD AUTO: 0.23 K/UL (ref 0–0.85)
MONOCYTES NFR BLD AUTO: 5.1 % (ref 0–13.4)
NEUTROPHILS # BLD AUTO: 2.42 K/UL (ref 2–7.15)
NEUTROPHILS NFR BLD: 53.8 % (ref 44–72)
NRBC # BLD AUTO: 0 K/UL
NRBC BLD-RTO: 0 /100 WBC
PLATELET # BLD AUTO: 247 K/UL (ref 164–446)
PMV BLD AUTO: 11.1 FL (ref 9–12.9)
POTASSIUM SERPL-SCNC: 4.4 MMOL/L (ref 3.6–5.5)
PROT SERPL-MCNC: 7.3 G/DL (ref 6–8.2)
RBC # BLD AUTO: 4.99 M/UL (ref 4.2–5.4)
SODIUM SERPL-SCNC: 140 MMOL/L (ref 135–145)
T4 FREE SERPL-MCNC: 1.15 NG/DL (ref 0.53–1.43)
TRIGL SERPL-MCNC: 71 MG/DL (ref 0–149)
TSH SERPL DL<=0.005 MIU/L-ACNC: 0.99 UIU/ML (ref 0.38–5.33)
WBC # BLD AUTO: 4.5 K/UL (ref 4.8–10.8)

## 2020-01-11 PROCEDURE — 85025 COMPLETE CBC W/AUTO DIFF WBC: CPT

## 2020-01-11 PROCEDURE — 84439 ASSAY OF FREE THYROXINE: CPT

## 2020-01-11 PROCEDURE — 83036 HEMOGLOBIN GLYCOSYLATED A1C: CPT

## 2020-01-11 PROCEDURE — 36415 COLL VENOUS BLD VENIPUNCTURE: CPT

## 2020-01-11 PROCEDURE — 84443 ASSAY THYROID STIM HORMONE: CPT

## 2020-01-11 PROCEDURE — 80061 LIPID PANEL: CPT

## 2020-01-11 PROCEDURE — 80053 COMPREHEN METABOLIC PANEL: CPT

## 2020-01-11 PROCEDURE — 82306 VITAMIN D 25 HYDROXY: CPT

## 2020-12-15 ENCOUNTER — HOSPITAL ENCOUNTER (OUTPATIENT)
Dept: LAB | Facility: MEDICAL CENTER | Age: 35
End: 2020-12-15
Attending: FAMILY MEDICINE
Payer: COMMERCIAL

## 2020-12-15 LAB
ALBUMIN SERPL BCP-MCNC: 4.7 G/DL (ref 3.2–4.9)
ALBUMIN/GLOB SERPL: 1.7 G/DL
ALP SERPL-CCNC: 50 U/L (ref 30–99)
ALT SERPL-CCNC: 16 U/L (ref 2–50)
ANION GAP SERPL CALC-SCNC: 8 MMOL/L (ref 7–16)
AST SERPL-CCNC: 15 U/L (ref 12–45)
BASOPHILS # BLD AUTO: 0.4 % (ref 0–1.8)
BASOPHILS # BLD: 0.02 K/UL (ref 0–0.12)
BILIRUB SERPL-MCNC: 0.4 MG/DL (ref 0.1–1.5)
BUN SERPL-MCNC: 9 MG/DL (ref 8–22)
CALCIUM SERPL-MCNC: 9.4 MG/DL (ref 8.5–10.5)
CHLORIDE SERPL-SCNC: 106 MMOL/L (ref 96–112)
CHOLEST SERPL-MCNC: 179 MG/DL (ref 100–199)
CO2 SERPL-SCNC: 27 MMOL/L (ref 20–33)
CREAT SERPL-MCNC: 0.58 MG/DL (ref 0.5–1.4)
EOSINOPHIL # BLD AUTO: 0.04 K/UL (ref 0–0.51)
EOSINOPHIL NFR BLD: 0.9 % (ref 0–6.9)
ERYTHROCYTE [DISTWIDTH] IN BLOOD BY AUTOMATED COUNT: 41.5 FL (ref 35.9–50)
FASTING STATUS PATIENT QL REPORTED: NORMAL
GLOBULIN SER CALC-MCNC: 2.8 G/DL (ref 1.9–3.5)
GLUCOSE SERPL-MCNC: 93 MG/DL (ref 65–99)
HCT VFR BLD AUTO: 42.8 % (ref 37–47)
HDLC SERPL-MCNC: 45 MG/DL
HGB BLD-MCNC: 14.5 G/DL (ref 12–16)
IMM GRANULOCYTES # BLD AUTO: 0.01 K/UL (ref 0–0.11)
IMM GRANULOCYTES NFR BLD AUTO: 0.2 % (ref 0–0.9)
LDLC SERPL CALC-MCNC: 120 MG/DL
LYMPHOCYTES # BLD AUTO: 1.85 K/UL (ref 1–4.8)
LYMPHOCYTES NFR BLD: 40.2 % (ref 22–41)
MCH RBC QN AUTO: 29.9 PG (ref 27–33)
MCHC RBC AUTO-ENTMCNC: 33.9 G/DL (ref 33.6–35)
MCV RBC AUTO: 88.2 FL (ref 81.4–97.8)
MONOCYTES # BLD AUTO: 0.22 K/UL (ref 0–0.85)
MONOCYTES NFR BLD AUTO: 4.8 % (ref 0–13.4)
NEUTROPHILS # BLD AUTO: 2.46 K/UL (ref 2–7.15)
NEUTROPHILS NFR BLD: 53.5 % (ref 44–72)
NRBC # BLD AUTO: 0 K/UL
NRBC BLD-RTO: 0 /100 WBC
PLATELET # BLD AUTO: 225 K/UL (ref 164–446)
PMV BLD AUTO: 10.8 FL (ref 9–12.9)
POTASSIUM SERPL-SCNC: 4.6 MMOL/L (ref 3.6–5.5)
PROT SERPL-MCNC: 7.5 G/DL (ref 6–8.2)
RBC # BLD AUTO: 4.85 M/UL (ref 4.2–5.4)
SODIUM SERPL-SCNC: 141 MMOL/L (ref 135–145)
TRIGL SERPL-MCNC: 71 MG/DL (ref 0–149)
WBC # BLD AUTO: 4.6 K/UL (ref 4.8–10.8)

## 2020-12-15 PROCEDURE — 36415 COLL VENOUS BLD VENIPUNCTURE: CPT

## 2020-12-15 PROCEDURE — 85025 COMPLETE CBC W/AUTO DIFF WBC: CPT

## 2020-12-15 PROCEDURE — 80061 LIPID PANEL: CPT

## 2020-12-15 PROCEDURE — 80053 COMPREHEN METABOLIC PANEL: CPT

## 2021-12-15 ENCOUNTER — HOSPITAL ENCOUNTER (OUTPATIENT)
Dept: LAB | Facility: MEDICAL CENTER | Age: 36
End: 2021-12-15
Attending: FAMILY MEDICINE
Payer: COMMERCIAL

## 2021-12-15 LAB
ALBUMIN SERPL BCP-MCNC: 4.6 G/DL (ref 3.2–4.9)
ALBUMIN/GLOB SERPL: 1.6 G/DL
ALP SERPL-CCNC: 46 U/L (ref 30–99)
ALT SERPL-CCNC: 9 U/L (ref 2–50)
ANION GAP SERPL CALC-SCNC: 11 MMOL/L (ref 7–16)
AST SERPL-CCNC: 5 U/L (ref 12–45)
BASOPHILS # BLD AUTO: 0.4 % (ref 0–1.8)
BASOPHILS # BLD: 0.02 K/UL (ref 0–0.12)
BILIRUB SERPL-MCNC: 0.5 MG/DL (ref 0.1–1.5)
BUN SERPL-MCNC: 10 MG/DL (ref 8–22)
CALCIUM SERPL-MCNC: 9 MG/DL (ref 8.5–10.5)
CHLORIDE SERPL-SCNC: 105 MMOL/L (ref 96–112)
CHOLEST SERPL-MCNC: 170 MG/DL (ref 100–199)
CO2 SERPL-SCNC: 23 MMOL/L (ref 20–33)
CREAT SERPL-MCNC: 0.51 MG/DL (ref 0.5–1.4)
EOSINOPHIL # BLD AUTO: 0.04 K/UL (ref 0–0.51)
EOSINOPHIL NFR BLD: 0.8 % (ref 0–6.9)
ERYTHROCYTE [DISTWIDTH] IN BLOOD BY AUTOMATED COUNT: 41 FL (ref 35.9–50)
FASTING STATUS PATIENT QL REPORTED: NORMAL
GLOBULIN SER CALC-MCNC: 2.9 G/DL (ref 1.9–3.5)
GLUCOSE SERPL-MCNC: 91 MG/DL (ref 65–99)
HCT VFR BLD AUTO: 44.7 % (ref 37–47)
HDLC SERPL-MCNC: 42 MG/DL
HGB BLD-MCNC: 15.1 G/DL (ref 12–16)
IMM GRANULOCYTES # BLD AUTO: 0.01 K/UL (ref 0–0.11)
IMM GRANULOCYTES NFR BLD AUTO: 0.2 % (ref 0–0.9)
LDLC SERPL CALC-MCNC: 115 MG/DL
LYMPHOCYTES # BLD AUTO: 1.79 K/UL (ref 1–4.8)
LYMPHOCYTES NFR BLD: 35.6 % (ref 22–41)
MCH RBC QN AUTO: 29.8 PG (ref 27–33)
MCHC RBC AUTO-ENTMCNC: 33.8 G/DL (ref 33.6–35)
MCV RBC AUTO: 88.2 FL (ref 81.4–97.8)
MONOCYTES # BLD AUTO: 0.25 K/UL (ref 0–0.85)
MONOCYTES NFR BLD AUTO: 5 % (ref 0–13.4)
NEUTROPHILS # BLD AUTO: 2.92 K/UL (ref 2–7.15)
NEUTROPHILS NFR BLD: 58 % (ref 44–72)
NRBC # BLD AUTO: 0 K/UL
NRBC BLD-RTO: 0 /100 WBC
PLATELET # BLD AUTO: 244 K/UL (ref 164–446)
PMV BLD AUTO: 11.3 FL (ref 9–12.9)
POTASSIUM SERPL-SCNC: 4.1 MMOL/L (ref 3.6–5.5)
PROT SERPL-MCNC: 7.5 G/DL (ref 6–8.2)
RBC # BLD AUTO: 5.07 M/UL (ref 4.2–5.4)
SODIUM SERPL-SCNC: 139 MMOL/L (ref 135–145)
TRIGL SERPL-MCNC: 66 MG/DL (ref 0–149)
WBC # BLD AUTO: 5 K/UL (ref 4.8–10.8)

## 2021-12-15 PROCEDURE — 80061 LIPID PANEL: CPT

## 2021-12-15 PROCEDURE — 80053 COMPREHEN METABOLIC PANEL: CPT

## 2021-12-15 PROCEDURE — 85025 COMPLETE CBC W/AUTO DIFF WBC: CPT

## 2021-12-15 PROCEDURE — 36415 COLL VENOUS BLD VENIPUNCTURE: CPT

## 2022-12-16 ENCOUNTER — HOSPITAL ENCOUNTER (OUTPATIENT)
Dept: LAB | Facility: MEDICAL CENTER | Age: 37
End: 2022-12-16
Attending: FAMILY MEDICINE
Payer: COMMERCIAL

## 2022-12-16 LAB
ALBUMIN SERPL BCP-MCNC: 5 G/DL (ref 3.2–4.9)
ALBUMIN/GLOB SERPL: 1.9 G/DL
ALP SERPL-CCNC: 58 U/L (ref 30–99)
ALT SERPL-CCNC: 14 U/L (ref 2–50)
ANION GAP SERPL CALC-SCNC: 9 MMOL/L (ref 7–16)
AST SERPL-CCNC: 15 U/L (ref 12–45)
BASOPHILS # BLD AUTO: 0.6 % (ref 0–1.8)
BASOPHILS # BLD: 0.03 K/UL (ref 0–0.12)
BILIRUB SERPL-MCNC: 0.5 MG/DL (ref 0.1–1.5)
BUN SERPL-MCNC: 7 MG/DL (ref 8–22)
CALCIUM ALBUM COR SERPL-MCNC: 8.5 MG/DL (ref 8.5–10.5)
CALCIUM SERPL-MCNC: 9.3 MG/DL (ref 8.5–10.5)
CHLORIDE SERPL-SCNC: 105 MMOL/L (ref 96–112)
CHOLEST SERPL-MCNC: 169 MG/DL (ref 100–199)
CO2 SERPL-SCNC: 27 MMOL/L (ref 20–33)
CREAT SERPL-MCNC: 0.56 MG/DL (ref 0.5–1.4)
EOSINOPHIL # BLD AUTO: 0.04 K/UL (ref 0–0.51)
EOSINOPHIL NFR BLD: 0.8 % (ref 0–6.9)
ERYTHROCYTE [DISTWIDTH] IN BLOOD BY AUTOMATED COUNT: 39.1 FL (ref 35.9–50)
FASTING STATUS PATIENT QL REPORTED: NORMAL
GFR SERPLBLD CREATININE-BSD FMLA CKD-EPI: 121 ML/MIN/1.73 M 2
GLOBULIN SER CALC-MCNC: 2.7 G/DL (ref 1.9–3.5)
GLUCOSE SERPL-MCNC: 98 MG/DL (ref 65–99)
HCT VFR BLD AUTO: 44.3 % (ref 37–47)
HDLC SERPL-MCNC: 42 MG/DL
HGB BLD-MCNC: 15 G/DL (ref 12–16)
IMM GRANULOCYTES # BLD AUTO: 0.01 K/UL (ref 0–0.11)
IMM GRANULOCYTES NFR BLD AUTO: 0.2 % (ref 0–0.9)
LDLC SERPL CALC-MCNC: 104 MG/DL
LYMPHOCYTES # BLD AUTO: 2.06 K/UL (ref 1–4.8)
LYMPHOCYTES NFR BLD: 39.8 % (ref 22–41)
MCH RBC QN AUTO: 29.4 PG (ref 27–33)
MCHC RBC AUTO-ENTMCNC: 33.9 G/DL (ref 33.6–35)
MCV RBC AUTO: 86.7 FL (ref 81.4–97.8)
MONOCYTES # BLD AUTO: 0.24 K/UL (ref 0–0.85)
MONOCYTES NFR BLD AUTO: 4.6 % (ref 0–13.4)
NEUTROPHILS # BLD AUTO: 2.79 K/UL (ref 2–7.15)
NEUTROPHILS NFR BLD: 54 % (ref 44–72)
NRBC # BLD AUTO: 0 K/UL
NRBC BLD-RTO: 0 /100 WBC
PLATELET # BLD AUTO: 245 K/UL (ref 164–446)
PMV BLD AUTO: 11.3 FL (ref 9–12.9)
POTASSIUM SERPL-SCNC: 4.3 MMOL/L (ref 3.6–5.5)
PROT SERPL-MCNC: 7.7 G/DL (ref 6–8.2)
RBC # BLD AUTO: 5.11 M/UL (ref 4.2–5.4)
SODIUM SERPL-SCNC: 141 MMOL/L (ref 135–145)
TRIGL SERPL-MCNC: 113 MG/DL (ref 0–149)
WBC # BLD AUTO: 5.2 K/UL (ref 4.8–10.8)

## 2022-12-16 PROCEDURE — 80053 COMPREHEN METABOLIC PANEL: CPT

## 2022-12-16 PROCEDURE — 36415 COLL VENOUS BLD VENIPUNCTURE: CPT

## 2022-12-16 PROCEDURE — 80061 LIPID PANEL: CPT

## 2022-12-16 PROCEDURE — 85025 COMPLETE CBC W/AUTO DIFF WBC: CPT

## 2023-12-23 ENCOUNTER — HOSPITAL ENCOUNTER (OUTPATIENT)
Dept: LAB | Facility: MEDICAL CENTER | Age: 38
End: 2023-12-23
Attending: FAMILY MEDICINE
Payer: COMMERCIAL

## 2023-12-23 LAB
ALBUMIN SERPL BCP-MCNC: 4.4 G/DL (ref 3.2–4.9)
ALBUMIN/GLOB SERPL: 1.6 G/DL
ALP SERPL-CCNC: 50 U/L (ref 30–99)
ALT SERPL-CCNC: 9 U/L (ref 2–50)
ANION GAP SERPL CALC-SCNC: 9 MMOL/L (ref 7–16)
AST SERPL-CCNC: 8 U/L (ref 12–45)
BASOPHILS # BLD AUTO: 1 % (ref 0–1.8)
BASOPHILS # BLD: 0.04 K/UL (ref 0–0.12)
BILIRUB SERPL-MCNC: 0.4 MG/DL (ref 0.1–1.5)
BUN SERPL-MCNC: 9 MG/DL (ref 8–22)
CALCIUM ALBUM COR SERPL-MCNC: 8.5 MG/DL (ref 8.5–10.5)
CALCIUM SERPL-MCNC: 8.8 MG/DL (ref 8.5–10.5)
CHLORIDE SERPL-SCNC: 104 MMOL/L (ref 96–112)
CHOLEST SERPL-MCNC: 179 MG/DL (ref 100–199)
CO2 SERPL-SCNC: 27 MMOL/L (ref 20–33)
CREAT SERPL-MCNC: 0.51 MG/DL (ref 0.5–1.4)
EOSINOPHIL # BLD AUTO: 0.06 K/UL (ref 0–0.51)
EOSINOPHIL NFR BLD: 1.6 % (ref 0–6.9)
ERYTHROCYTE [DISTWIDTH] IN BLOOD BY AUTOMATED COUNT: 40.1 FL (ref 35.9–50)
GFR SERPLBLD CREATININE-BSD FMLA CKD-EPI: 123 ML/MIN/1.73 M 2
GLOBULIN SER CALC-MCNC: 2.8 G/DL (ref 1.9–3.5)
GLUCOSE SERPL-MCNC: 92 MG/DL (ref 65–99)
HCT VFR BLD AUTO: 41.5 % (ref 37–47)
HDLC SERPL-MCNC: 53 MG/DL
HGB BLD-MCNC: 13.9 G/DL (ref 12–16)
IMM GRANULOCYTES # BLD AUTO: 0.01 K/UL (ref 0–0.11)
IMM GRANULOCYTES NFR BLD AUTO: 0.3 % (ref 0–0.9)
LDLC SERPL CALC-MCNC: 113 MG/DL
LYMPHOCYTES # BLD AUTO: 1.72 K/UL (ref 1–4.8)
LYMPHOCYTES NFR BLD: 44.6 % (ref 22–41)
MCH RBC QN AUTO: 28.8 PG (ref 27–33)
MCHC RBC AUTO-ENTMCNC: 33.5 G/DL (ref 32.2–35.5)
MCV RBC AUTO: 85.9 FL (ref 81.4–97.8)
MONOCYTES # BLD AUTO: 0.23 K/UL (ref 0–0.85)
MONOCYTES NFR BLD AUTO: 6 % (ref 0–13.4)
NEUTROPHILS # BLD AUTO: 1.8 K/UL (ref 1.82–7.42)
NEUTROPHILS NFR BLD: 46.5 % (ref 44–72)
NRBC # BLD AUTO: 0 K/UL
NRBC BLD-RTO: 0 /100 WBC (ref 0–0.2)
PLATELET # BLD AUTO: 233 K/UL (ref 164–446)
PMV BLD AUTO: 10.8 FL (ref 9–12.9)
POTASSIUM SERPL-SCNC: 4.4 MMOL/L (ref 3.6–5.5)
PROT SERPL-MCNC: 7.2 G/DL (ref 6–8.2)
RBC # BLD AUTO: 4.83 M/UL (ref 4.2–5.4)
SODIUM SERPL-SCNC: 140 MMOL/L (ref 135–145)
TRIGL SERPL-MCNC: 65 MG/DL (ref 0–149)
WBC # BLD AUTO: 3.9 K/UL (ref 4.8–10.8)

## 2023-12-23 PROCEDURE — 85025 COMPLETE CBC W/AUTO DIFF WBC: CPT

## 2023-12-23 PROCEDURE — 36415 COLL VENOUS BLD VENIPUNCTURE: CPT

## 2023-12-23 PROCEDURE — 80061 LIPID PANEL: CPT

## 2023-12-23 PROCEDURE — 80053 COMPREHEN METABOLIC PANEL: CPT

## 2024-08-28 ENCOUNTER — HOSPITAL ENCOUNTER (OUTPATIENT)
Dept: LAB | Facility: MEDICAL CENTER | Age: 39
End: 2024-08-28
Attending: OBSTETRICS & GYNECOLOGY
Payer: COMMERCIAL

## 2024-08-28 LAB
BASOPHILS # BLD AUTO: 0.4 % (ref 0–1.8)
BASOPHILS # BLD: 0.02 K/UL (ref 0–0.12)
EOSINOPHIL # BLD AUTO: 0.05 K/UL (ref 0–0.51)
EOSINOPHIL NFR BLD: 0.9 % (ref 0–6.9)
ERYTHROCYTE [DISTWIDTH] IN BLOOD BY AUTOMATED COUNT: 41.1 FL (ref 35.9–50)
ESTRADIOL SERPL-MCNC: 26.6 PG/ML
FSH SERPL-ACNC: 7.1 MIU/ML
HCT VFR BLD AUTO: 40.2 % (ref 37–47)
HGB BLD-MCNC: 13.8 G/DL (ref 12–16)
IMM GRANULOCYTES # BLD AUTO: 0.01 K/UL (ref 0–0.11)
IMM GRANULOCYTES NFR BLD AUTO: 0.2 % (ref 0–0.9)
LYMPHOCYTES # BLD AUTO: 2.34 K/UL (ref 1–4.8)
LYMPHOCYTES NFR BLD: 44 % (ref 22–41)
MCH RBC QN AUTO: 29.7 PG (ref 27–33)
MCHC RBC AUTO-ENTMCNC: 34.3 G/DL (ref 32.2–35.5)
MCV RBC AUTO: 86.5 FL (ref 81.4–97.8)
MONOCYTES # BLD AUTO: 0.28 K/UL (ref 0–0.85)
MONOCYTES NFR BLD AUTO: 5.3 % (ref 0–13.4)
NEUTROPHILS # BLD AUTO: 2.62 K/UL (ref 1.82–7.42)
NEUTROPHILS NFR BLD: 49.2 % (ref 44–72)
NRBC # BLD AUTO: 0 K/UL
NRBC BLD-RTO: 0 /100 WBC (ref 0–0.2)
PLATELET # BLD AUTO: 266 K/UL (ref 164–446)
PMV BLD AUTO: 11 FL (ref 9–12.9)
RBC # BLD AUTO: 4.65 M/UL (ref 4.2–5.4)
TSH SERPL-ACNC: 1.38 UIU/ML (ref 0.35–5.5)
WBC # BLD AUTO: 5.3 K/UL (ref 4.8–10.8)

## 2024-08-28 PROCEDURE — 83001 ASSAY OF GONADOTROPIN (FSH): CPT

## 2024-08-28 PROCEDURE — 84443 ASSAY THYROID STIM HORMONE: CPT

## 2024-08-28 PROCEDURE — 85025 COMPLETE CBC W/AUTO DIFF WBC: CPT

## 2024-08-28 PROCEDURE — 36415 COLL VENOUS BLD VENIPUNCTURE: CPT

## 2024-08-28 PROCEDURE — 82670 ASSAY OF TOTAL ESTRADIOL: CPT

## 2024-12-18 ENCOUNTER — HOSPITAL ENCOUNTER (OUTPATIENT)
Dept: LAB | Facility: MEDICAL CENTER | Age: 39
End: 2024-12-18
Attending: FAMILY MEDICINE
Payer: COMMERCIAL

## 2024-12-18 LAB
BASOPHILS # BLD AUTO: 0.7 % (ref 0–1.8)
BASOPHILS # BLD: 0.03 K/UL (ref 0–0.12)
EOSINOPHIL # BLD AUTO: 0.03 K/UL (ref 0–0.51)
EOSINOPHIL NFR BLD: 0.7 % (ref 0–6.9)
ERYTHROCYTE [DISTWIDTH] IN BLOOD BY AUTOMATED COUNT: 43.8 FL (ref 35.9–50)
HCT VFR BLD AUTO: 42.3 % (ref 37–47)
HGB BLD-MCNC: 13.6 G/DL (ref 12–16)
IMM GRANULOCYTES # BLD AUTO: 0.01 K/UL (ref 0–0.11)
IMM GRANULOCYTES NFR BLD AUTO: 0.2 % (ref 0–0.9)
LYMPHOCYTES # BLD AUTO: 2.06 K/UL (ref 1–4.8)
LYMPHOCYTES NFR BLD: 44.7 % (ref 22–41)
MCH RBC QN AUTO: 28.1 PG (ref 27–33)
MCHC RBC AUTO-ENTMCNC: 32.2 G/DL (ref 32.2–35.5)
MCV RBC AUTO: 87.4 FL (ref 81.4–97.8)
MONOCYTES # BLD AUTO: 0.27 K/UL (ref 0–0.85)
MONOCYTES NFR BLD AUTO: 5.9 % (ref 0–13.4)
NEUTROPHILS # BLD AUTO: 2.21 K/UL (ref 1.82–7.42)
NEUTROPHILS NFR BLD: 47.8 % (ref 44–72)
NRBC # BLD AUTO: 0 K/UL
NRBC BLD-RTO: 0 /100 WBC (ref 0–0.2)
PLATELET # BLD AUTO: 264 K/UL (ref 164–446)
PMV BLD AUTO: 10.9 FL (ref 9–12.9)
RBC # BLD AUTO: 4.84 M/UL (ref 4.2–5.4)
WBC # BLD AUTO: 4.6 K/UL (ref 4.8–10.8)

## 2024-12-18 PROCEDURE — 81162 BRCA1&2 GEN FULL SEQ DUP/DEL: CPT

## 2024-12-18 PROCEDURE — 36415 COLL VENOUS BLD VENIPUNCTURE: CPT

## 2024-12-18 PROCEDURE — 85025 COMPLETE CBC W/AUTO DIFF WBC: CPT

## 2024-12-18 PROCEDURE — 80061 LIPID PANEL: CPT

## 2024-12-18 PROCEDURE — 80053 COMPREHEN METABOLIC PANEL: CPT

## 2024-12-19 LAB
ALBUMIN SERPL BCP-MCNC: 4.6 G/DL (ref 3.2–4.9)
ALBUMIN/GLOB SERPL: 1.7 G/DL
ALP SERPL-CCNC: 55 U/L (ref 30–99)
ALT SERPL-CCNC: 15 U/L (ref 2–50)
ANION GAP SERPL CALC-SCNC: 12 MMOL/L (ref 7–16)
AST SERPL-CCNC: 18 U/L (ref 12–45)
BILIRUB SERPL-MCNC: 0.5 MG/DL (ref 0.1–1.5)
BUN SERPL-MCNC: 10 MG/DL (ref 8–22)
CALCIUM ALBUM COR SERPL-MCNC: 8.4 MG/DL (ref 8.5–10.5)
CALCIUM SERPL-MCNC: 8.9 MG/DL (ref 8.5–10.5)
CHLORIDE SERPL-SCNC: 104 MMOL/L (ref 96–112)
CHOLEST SERPL-MCNC: 172 MG/DL (ref 100–199)
CO2 SERPL-SCNC: 23 MMOL/L (ref 20–33)
CREAT SERPL-MCNC: 0.52 MG/DL (ref 0.5–1.4)
GFR SERPLBLD CREATININE-BSD FMLA CKD-EPI: 122 ML/MIN/1.73 M 2
GLOBULIN SER CALC-MCNC: 2.7 G/DL (ref 1.9–3.5)
GLUCOSE SERPL-MCNC: 86 MG/DL (ref 65–99)
HDLC SERPL-MCNC: 42 MG/DL
LDLC SERPL CALC-MCNC: 111 MG/DL
POTASSIUM SERPL-SCNC: 3.9 MMOL/L (ref 3.6–5.5)
PROT SERPL-MCNC: 7.3 G/DL (ref 6–8.2)
SODIUM SERPL-SCNC: 139 MMOL/L (ref 135–145)
TRIGL SERPL-MCNC: 96 MG/DL (ref 0–149)

## 2024-12-26 LAB
BRCA1+BRCA2 DEL+DUP + FULL MUT ANL BLD/T: NEGATIVE
SPECIMEN SOURCE L312082: NORMAL